# Patient Record
Sex: MALE | Race: WHITE | Employment: UNEMPLOYED | URBAN - NONMETROPOLITAN AREA
[De-identification: names, ages, dates, MRNs, and addresses within clinical notes are randomized per-mention and may not be internally consistent; named-entity substitution may affect disease eponyms.]

---

## 2019-07-18 ENCOUNTER — HOSPITAL ENCOUNTER (EMERGENCY)
Age: 59
Discharge: HOME | End: 2019-07-18
Attending: NURSE PRACTITIONER
Payer: COMMERCIAL

## 2019-07-18 VITALS
HEART RATE: 99 BPM | DIASTOLIC BLOOD PRESSURE: 83 MMHG | OXYGEN SATURATION: 93 % | BODY MASS INDEX: 19.33 KG/M2 | TEMPERATURE: 97.8 F | RESPIRATION RATE: 16 BRPM | WEIGHT: 135 LBS | SYSTOLIC BLOOD PRESSURE: 138 MMHG | HEIGHT: 70 IN

## 2019-07-18 DIAGNOSIS — S05.01XA ABRASION OF RIGHT CORNEA, INITIAL ENCOUNTER: Primary | ICD-10-CM

## 2019-07-18 PROCEDURE — 99202 OFFICE O/P NEW SF 15 MIN: CPT

## 2019-07-18 PROCEDURE — 6370000000 HC RX 637 (ALT 250 FOR IP): Performed by: NURSE PRACTITIONER

## 2019-07-18 PROCEDURE — 99203 OFFICE O/P NEW LOW 30 MIN: CPT | Performed by: NURSE PRACTITIONER

## 2019-07-18 RX ORDER — ALBUTEROL SULFATE 2.5 MG/3ML
2.5 SOLUTION RESPIRATORY (INHALATION) EVERY 6 HOURS PRN
COMMUNITY

## 2019-07-18 RX ORDER — PROPARACAINE HYDROCHLORIDE 5 MG/ML
1 SOLUTION/ DROPS OPHTHALMIC ONCE
Status: COMPLETED | OUTPATIENT
Start: 2019-07-18 | End: 2019-07-18

## 2019-07-18 RX ORDER — GENTAMICIN SULFATE 3 MG/ML
1 SOLUTION/ DROPS OPHTHALMIC 4 TIMES DAILY
Qty: 1 BOTTLE | Refills: 0 | Status: SHIPPED | OUTPATIENT
Start: 2019-07-18 | End: 2019-07-28

## 2019-07-18 RX ORDER — ALBUTEROL SULFATE 90 UG/1
2 AEROSOL, METERED RESPIRATORY (INHALATION) EVERY 6 HOURS PRN
COMMUNITY

## 2019-07-18 RX ADMIN — PROPARACAINE HYDROCHLORIDE 1 DROP: 5 SOLUTION/ DROPS OPHTHALMIC at 14:32

## 2019-07-18 ASSESSMENT — PAIN SCALES - GENERAL: PAINLEVEL_OUTOF10: 7

## 2019-07-18 ASSESSMENT — PAIN DESCRIPTION - ORIENTATION: ORIENTATION: RIGHT

## 2019-07-18 ASSESSMENT — ENCOUNTER SYMPTOMS
SHORTNESS OF BREATH: 1
PHOTOPHOBIA: 1
CHEST TIGHTNESS: 1
EYE REDNESS: 1

## 2019-07-18 ASSESSMENT — PAIN DESCRIPTION - FREQUENCY: FREQUENCY: INTERMITTENT

## 2019-07-18 ASSESSMENT — PAIN DESCRIPTION - ONSET: ONSET: ON-GOING

## 2019-07-18 ASSESSMENT — PAIN DESCRIPTION - PAIN TYPE: TYPE: ACUTE PAIN

## 2019-07-18 ASSESSMENT — PAIN DESCRIPTION - PROGRESSION: CLINICAL_PROGRESSION: NOT CHANGED

## 2019-07-18 ASSESSMENT — PAIN DESCRIPTION - DESCRIPTORS: DESCRIPTORS: ACHING

## 2019-07-18 ASSESSMENT — PAIN DESCRIPTION - LOCATION: LOCATION: EYE

## 2019-07-18 NOTE — ED PROVIDER NOTES
[address]  Urgent Care Encounter      CHIEF COMPLAINT       Chief Complaint   Patient presents with    Eye Problem       Nurses Notes reviewed and I agree except as noted in the HPI. HISTORY OF PRESENT ILLNESS   Arben Burger is a 61 y.o. male who presents with a possible foreign body in his right eye. The incident occurred on Saturday when he was helping a friend use a  on a chair. He felt something go under his glasses and into his right eye. He is complaining of pain, blurred vision and sensitivity to light. He is from [place] here visiting a friend. He states he has a primary care provider in [place] and an eye doctor. Tetanus is up-to-date. He describes the pain as coming and going. He feels like it is in the inner corner of the right eye. He is in no acute distress. REVIEW OF SYSTEMS     Review of Systems   Eyes: Positive for photophobia, redness and visual disturbance (blurring right eye). Respiratory: Positive for chest tightness (COPD ) and shortness of breath (COPD lung mass). PAST MEDICAL HISTORY         Diagnosis Date    Cancer [facility])     prostate    COPD (chronic obstructive pulmonary disease) ([facility] [address]     Lung mass        SURGICAL HISTORY     Patient  has a past surgical history that includes Prostate surgery; eye muscle surgery; Wrist surgery; and knee surgery.     CURRENT MEDICATIONS       Discharge Medication List as of 7/18/2019  2:45 PM      CONTINUE these medications which have NOT CHANGED    Details   albuterol sulfate  (90 Base) MCG/ACT inhaler Inhale 2 puffs into the lungs every 6 hours as needed for WheezingHistorical Med      Budesonide-Formoterol Fumarate (SYMBICORT IN) Inhale into the lungsHistorical Med      Fluticasone Furoate-Vilanterol (BREO ELLIPTA IN) Inhale into the lungsHistorical Med      albuterol (PROVENTIL) (2.5 MG/3ML) 0.083% nebulizer solution Take 2.5 mg by nebulization every 6 hours as needed for [address]     Patient is has No Known Allergies. FAMILY HISTORY     Patient'sfamily history includes Other in his mother. SOCIAL HISTORY     Patient  reports that he has been smoking cigarettes. He has been smoking about 0.50 packs per day. He has never used smokeless tobacco. He reports that he drinks alcohol. He reports that he does not use drugs. PHYSICAL EXAM     ED TRIAGE VITALS  BP: 138/83, Temp: 97.8 °F (36.6 °C), Pulse: 99, Resp: 16, SpO2: 93 %  Physical Exam   Constitutional: He is oriented to person, place, and time. He appears well-developed and well-nourished. HENT:   Head: Normocephalic and atraumatic. Eyes: Pupils are equal, round, and reactive to light. Lids are normal. Right eye exhibits no discharge. No foreign body present in the right eye. Right conjunctiva is not injected. Left conjunctiva is not injected. Left eye exhibits abnormal extraocular motion (lateral drift). Left eye exhibits no nystagmus. Pupils are equal.   Slit lamp exam:       The right eye shows corneal abrasion and fluorescein uptake. Neck: Normal range of motion. Neck supple. Cardiovascular: Normal rate, regular rhythm, S1 normal and S2 normal. Exam reveals no gallop. Murmur heard. Pulmonary/Chest: Effort normal and breath sounds normal.   Musculoskeletal: Normal range of motion. Right knee: He exhibits normal range of motion. Left knee: He exhibits normal range of motion. Neurological: He is alert and oriented to person, place, and time. Skin: Skin is warm, dry and intact. Capillary refill takes 2 to 3 seconds. Psychiatric: He has a normal mood and affect. His behavior is normal.   Nursing note and vitals reviewed. DIAGNOSTIC RESULTS   Labs: No results found for this visit on 07/18/19.     IMAGING:    URGENT CARE COURSE:     Vitals:    07/18/19 1428   BP: 138/83   Pulse: 99   Resp: 16   Temp: 97.8 °F (36.6 °C)   TempSrc: Temporal   SpO2: 93%

## 2019-07-18 NOTE — ED TRIAGE NOTES
To room with c/o foreign body in right eye since Saturday. He was helping a friend while they were using a  on  a chair when something flew under his glasses and into his eye. Reports blurred vision, photophobia, and pain.